# Patient Record
Sex: FEMALE | Race: WHITE | NOT HISPANIC OR LATINO | Employment: OTHER | URBAN - METROPOLITAN AREA
[De-identification: names, ages, dates, MRNs, and addresses within clinical notes are randomized per-mention and may not be internally consistent; named-entity substitution may affect disease eponyms.]

---

## 2017-03-23 ENCOUNTER — APPOINTMENT (OUTPATIENT)
Dept: GENERAL RADIOLOGY | Facility: HOSPITAL | Age: 82
End: 2017-03-23

## 2017-03-23 ENCOUNTER — HOSPITAL ENCOUNTER (EMERGENCY)
Facility: HOSPITAL | Age: 82
Discharge: HOME | End: 2017-03-23
Attending: EMERGENCY MEDICINE | Admitting: EMERGENCY MEDICINE

## 2017-03-23 ENCOUNTER — APPOINTMENT (OUTPATIENT)
Dept: CT IMAGING | Facility: HOSPITAL | Age: 82
End: 2017-03-23

## 2017-03-23 VITALS
SYSTOLIC BLOOD PRESSURE: 144 MMHG | OXYGEN SATURATION: 93 % | DIASTOLIC BLOOD PRESSURE: 75 MMHG | BODY MASS INDEX: 21.6 KG/M2 | RESPIRATION RATE: 14 BRPM | WEIGHT: 110 LBS | TEMPERATURE: 98.2 F | HEART RATE: 77 BPM | HEIGHT: 60 IN

## 2017-03-23 DIAGNOSIS — R29.6 MULTIPLE FALLS: ICD-10-CM

## 2017-03-23 DIAGNOSIS — I62.01 ACUTE ON CHRONIC INTRACRANIAL SUBDURAL HEMATOMA (HCC): Primary | ICD-10-CM

## 2017-03-23 DIAGNOSIS — I62.03 ACUTE ON CHRONIC INTRACRANIAL SUBDURAL HEMATOMA (HCC): Primary | ICD-10-CM

## 2017-03-23 DIAGNOSIS — S01.81XA FACIAL LACERATION, INITIAL ENCOUNTER: ICD-10-CM

## 2017-03-23 DIAGNOSIS — I10 UNCONTROLLED HYPERTENSION: ICD-10-CM

## 2017-03-23 DIAGNOSIS — T07.XXXA MULTIPLE ABRASIONS: ICD-10-CM

## 2017-03-23 LAB
ANION GAP SERPL CALCULATED.3IONS-SCNC: 5 MMOL/L (ref 3–11)
BASOPHILS # BLD AUTO: 0.02 10*3/MM3 (ref 0–0.2)
BASOPHILS NFR BLD AUTO: 0.3 % (ref 0–1)
BUN BLD-MCNC: 26 MG/DL (ref 9–23)
BUN/CREAT SERPL: 16.3 (ref 7–25)
CALCIUM SPEC-SCNC: 9.9 MG/DL (ref 8.7–10.4)
CHLORIDE SERPL-SCNC: 108 MMOL/L (ref 99–109)
CO2 SERPL-SCNC: 25 MMOL/L (ref 20–31)
CREAT BLD-MCNC: 1.6 MG/DL (ref 0.6–1.3)
DEPRECATED RDW RBC AUTO: 48.6 FL (ref 37–54)
EOSINOPHIL # BLD AUTO: 0.12 10*3/MM3 (ref 0.1–0.3)
EOSINOPHIL NFR BLD AUTO: 1.8 % (ref 0–3)
ERYTHROCYTE [DISTWIDTH] IN BLOOD BY AUTOMATED COUNT: 13.2 % (ref 11.3–14.5)
GFR SERPL CREATININE-BSD FRML MDRD: 31 ML/MIN/1.73
GLUCOSE BLD-MCNC: 96 MG/DL (ref 70–100)
HCT VFR BLD AUTO: 34.6 % (ref 34.5–44)
HGB BLD-MCNC: 11.6 G/DL (ref 11.5–15.5)
HOLD SPECIMEN: NORMAL
HOLD SPECIMEN: NORMAL
IMM GRANULOCYTES # BLD: 0 10*3/MM3 (ref 0–0.03)
IMM GRANULOCYTES NFR BLD: 0 % (ref 0–0.6)
LYMPHOCYTES # BLD AUTO: 1.26 10*3/MM3 (ref 0.6–4.8)
LYMPHOCYTES NFR BLD AUTO: 19.1 % (ref 24–44)
MCH RBC QN AUTO: 33.9 PG (ref 27–31)
MCHC RBC AUTO-ENTMCNC: 33.5 G/DL (ref 32–36)
MCV RBC AUTO: 101.2 FL (ref 80–99)
MONOCYTES # BLD AUTO: 0.83 10*3/MM3 (ref 0–1)
MONOCYTES NFR BLD AUTO: 12.6 % (ref 0–12)
NEUTROPHILS # BLD AUTO: 4.38 10*3/MM3 (ref 1.5–8.3)
NEUTROPHILS NFR BLD AUTO: 66.2 % (ref 41–71)
PLATELET # BLD AUTO: 313 10*3/MM3 (ref 150–450)
PMV BLD AUTO: 9.7 FL (ref 6–12)
POTASSIUM BLD-SCNC: 3.9 MMOL/L (ref 3.5–5.5)
RBC # BLD AUTO: 3.42 10*6/MM3 (ref 3.89–5.14)
SODIUM BLD-SCNC: 138 MMOL/L (ref 132–146)
TROPONIN I SERPL-MCNC: 0 NG/ML (ref 0–0.07)
WBC NRBC COR # BLD: 6.61 10*3/MM3 (ref 3.5–10.8)
WHOLE BLOOD HOLD SPECIMEN: NORMAL
WHOLE BLOOD HOLD SPECIMEN: NORMAL

## 2017-03-23 PROCEDURE — 25010000002 TDAP 5-2.5-18.5 LF-MCG/0.5 SUSPENSION: Performed by: EMERGENCY MEDICINE

## 2017-03-23 PROCEDURE — 85025 COMPLETE CBC W/AUTO DIFF WBC: CPT | Performed by: EMERGENCY MEDICINE

## 2017-03-23 PROCEDURE — 90471 IMMUNIZATION ADMIN: CPT | Performed by: EMERGENCY MEDICINE

## 2017-03-23 PROCEDURE — 70450 CT HEAD/BRAIN W/O DYE: CPT

## 2017-03-23 PROCEDURE — 72125 CT NECK SPINE W/O DYE: CPT

## 2017-03-23 PROCEDURE — 80048 BASIC METABOLIC PNL TOTAL CA: CPT | Performed by: EMERGENCY MEDICINE

## 2017-03-23 PROCEDURE — 93005 ELECTROCARDIOGRAM TRACING: CPT

## 2017-03-23 PROCEDURE — 99285 EMERGENCY DEPT VISIT HI MDM: CPT

## 2017-03-23 PROCEDURE — 73030 X-RAY EXAM OF SHOULDER: CPT

## 2017-03-23 PROCEDURE — 36415 COLL VENOUS BLD VENIPUNCTURE: CPT

## 2017-03-23 PROCEDURE — 96376 TX/PRO/DX INJ SAME DRUG ADON: CPT

## 2017-03-23 PROCEDURE — 90715 TDAP VACCINE 7 YRS/> IM: CPT | Performed by: EMERGENCY MEDICINE

## 2017-03-23 PROCEDURE — 73130 X-RAY EXAM OF HAND: CPT

## 2017-03-23 PROCEDURE — 96374 THER/PROPH/DIAG INJ IV PUSH: CPT

## 2017-03-23 PROCEDURE — 84484 ASSAY OF TROPONIN QUANT: CPT

## 2017-03-23 RX ORDER — BACITRACIN, NEOMYCIN, POLYMYXIN B 400; 3.5; 5 [USP'U]/G; MG/G; [USP'U]/G
1 OINTMENT TOPICAL ONCE
Status: DISCONTINUED | OUTPATIENT
Start: 2017-03-23 | End: 2017-03-23

## 2017-03-23 RX ORDER — LABETALOL HYDROCHLORIDE 5 MG/ML
20 INJECTION, SOLUTION INTRAVENOUS
Status: DISCONTINUED | OUTPATIENT
Start: 2017-03-23 | End: 2017-03-23 | Stop reason: HOSPADM

## 2017-03-23 RX ORDER — AMITRIPTYLINE HYDROCHLORIDE 25 MG/1
25 TABLET, FILM COATED ORAL NIGHTLY
COMMUNITY

## 2017-03-23 RX ORDER — LABETALOL HYDROCHLORIDE 5 MG/ML
20 INJECTION, SOLUTION INTRAVENOUS ONCE
Status: COMPLETED | OUTPATIENT
Start: 2017-03-23 | End: 2017-03-23

## 2017-03-23 RX ORDER — ACETAMINOPHEN 500 MG
1000 TABLET ORAL DAILY
COMMUNITY

## 2017-03-23 RX ORDER — METOPROLOL SUCCINATE 50 MG/1
50 TABLET, EXTENDED RELEASE ORAL DAILY
COMMUNITY

## 2017-03-23 RX ORDER — SODIUM CHLORIDE 0.9 % (FLUSH) 0.9 %
10 SYRINGE (ML) INJECTION AS NEEDED
Status: DISCONTINUED | OUTPATIENT
Start: 2017-03-23 | End: 2017-03-23 | Stop reason: HOSPADM

## 2017-03-23 RX ORDER — BACITRACIN, NEOMYCIN, POLYMYXIN B 400; 3.5; 5 [USP'U]/G; MG/G; [USP'U]/G
1 OINTMENT TOPICAL ONCE
Status: COMPLETED | OUTPATIENT
Start: 2017-03-23 | End: 2017-03-23

## 2017-03-23 RX ADMIN — LABETALOL HYDROCHLORIDE 20 MG: 5 INJECTION, SOLUTION INTRAVENOUS at 13:10

## 2017-03-23 RX ADMIN — TETANUS TOXOID, REDUCED DIPHTHERIA TOXOID AND ACELLULAR PERTUSSIS VACCINE, ADSORBED 0.5 ML: 5; 2.5; 8; 8; 2.5 SUSPENSION INTRAMUSCULAR at 13:09

## 2017-03-23 RX ADMIN — LABETALOL HYDROCHLORIDE 20 MG: 5 INJECTION, SOLUTION INTRAVENOUS at 14:08

## 2017-03-23 RX ADMIN — LABETALOL HYDROCHLORIDE 20 MG: 5 INJECTION, SOLUTION INTRAVENOUS at 14:34

## 2017-03-23 RX ADMIN — BACITRACIN, NEOMYCIN, POLYMYXIN B 1 APPLICATION: 400; 3.5; 5 OINTMENT TOPICAL at 14:36

## 2017-03-23 NOTE — DISCHARGE INSTRUCTIONS
Follow up with ELOY Sultana, or another primary care provider for further care and evaluation of your elevated blood pressure. Use a walker when you are up and walking. Follow up with Dr. Jhony Corley next week.    Return to the Emergency Department for new or worsening concerns.

## 2017-03-23 NOTE — PROGRESS NOTES
Continued Stay Note  [facility]     Patient Name: Adele Ramsey  MRN: [id number]  Today's Date: 3/23/2017    Admit Date: 3/23/2017          Discharge Plan       03/23/17 1516    Case Management/Social Work Plan    Plan discharge planning    Patient/Family In Agreement With Plan unable to assess    Additional Comments JORDANA was contacted to speak to pt. and her daughter, Jerry Guillermo, regarding care services in the home.  JORDANA provided pt. and daughter with a list of sitter service providers in [place] as well as home health agencies.  SW explained the difference in the services and provided support.  Pt. and daughter appeared more interested in home health services at this time.  Pt. confirmed that she lives in her home alone in [place] and has assistive devices available to her, however her daughter stated pt. refuses to use them.  JORDANA encouraged pt. to use her cane/walker when ambulating.  JORDANA was requested by pt./daughter to call pt.'s PCP (Erika Driscoll PA-C at [facility], [phone]) to inquire about order for home health services and to schedule an appointment if needed.  JORDANA spoke with Zahraa at [facility] and it was confirmed that pt. required a face-to-face in order to receive a home health order.  A follow-up appointment was scheduled for Monday, 3/27/17 at 1:30pm.  JORDANA called pt's daughter to inform her of the scheduled appointment.  Daughter was unsure if pt. would follow-through with home health services.  JORDANA strongly encouraged daughter to assist in getting pt. to follow-up with her PCP to further addres home health needs.  Daughter verbalized understanding and reported no further needs.               Discharge Codes     None            NORA Cross

## 2017-03-23 NOTE — ED PROVIDER NOTES
Subjective   HPI Comments: Adele Ramsey is a 85 y.o.female who presents to the ED c/o fall, which occurred just PTA. Pt reports she was walking on an unsteady sidewalk when she somehow fell onto her right side and hit her head in the street. She denies any LOC associated with the fall and she does not take blood thinners. Upon examination in the ED, pt reports pain to the right side of her head, right wrist and shoulders with some mild blurred vision in her right eye. Her right shoulder pain is greater than her left shoulder. Pt has a c-collar in place and she denies any neck pain, CP or SOA. Her neighbor reports she has been increasingly week the past several weeks and notes the pt's  passed away last week.      Hx of cataracts with corrective surgery.    Pt sees a Nurse Practitioner in [place] for her primary care.    Patient is a 85 y.o. female presenting with fall.   History provided by:  Patient and friend  Fall   Mechanism of injury: fall    Injury location:  Head/neck and shoulder/arm  Head/neck injury location:  Head  Shoulder/arm injury location:  R shoulder and R wrist  Incident location:  Street  Arrived directly from scene: yes    Fall:     Fall occurred:  Walking    Impact surface:  [place]    Point of impact:  Head  Protective equipment: none    Tetanus status:  Out of date  Prior to arrival data:     Patient ambulatory at scene: no      Blood loss:  Minimal    Responsiveness at scene:  Alert    Loss of consciousness: no    Current pain details:     Pain Severity:  Moderate    Pain timing:  Constant  Associated symptoms: headaches    Associated symptoms: no abdominal pain, no back pain, no blindness, no chest pain, no difficulty breathing, no loss of consciousness and no neck pain    Headaches:     Severity:  Moderate    Onset quality:  Sudden    Timing:  Constant    Chronicity:  New  Risk factors: no anticoagulation therapy        Review of Systems   Eyes: Negative for blindness.   Respiratory:  Negative for shortness of breath.    Cardiovascular: Negative for chest pain.   Gastrointestinal: Negative for abdominal pain.   Musculoskeletal: Negative for back pain and neck pain.   Neurological: Positive for headaches. Negative for loss of consciousness.   All other systems reviewed and are negative.      Past Medical History:   Diagnosis Date   • Arthritis    • GERD (gastroesophageal reflux disease)    • Hypertension        No Known Allergies    History reviewed. No pertinent surgical history.    History reviewed. No pertinent family history.    Social History     Social History   • Marital status:      Spouse name: N/A   • Number of children: N/A   • Years of education: N/A     Social History Main Topics   • Smoking status: Heavy Tobacco Smoker     Packs/day: 1.00     Years: 60.00     Types: Cigarettes   • Smokeless tobacco: None   • Alcohol use 4.2 oz/week     7 Glasses of wine per week   • Drug use: No   • Sexual activity: No     Other Topics Concern   • None     Social History Narrative    Recent death of spouse w/in last week         Objective   Physical Exam   Constitutional: She is oriented to person, place, and time. She appears well-developed and well-nourished.   HENT:   Head: Normocephalic.   Right Ear: External ear normal.   Left Ear: External ear normal.   Nose: Nose normal.   Mouth/Throat: Oropharynx is clear and moist.   1 cm shallow laceration to lateral aspect of right eyebrow. Bruising of right eyelid. Airway is patent and pharynx is benign.   Eyes: Conjunctivae are normal.   Anterior chamber andd posterior chambers of right eye are clear.   Neck: Neck supple.   Cardiovascular: Normal rate, regular rhythm and normal heart sounds.  Exam reveals no gallop and no friction rub.    No murmur heard.  Pulmonary/Chest: Effort normal and breath sounds normal. No respiratory distress. She has no wheezes. She has no rales. She exhibits no tenderness.   Abdominal: Soft. There is no tenderness.    Musculoskeletal: Normal range of motion. She exhibits tenderness (Pain in right trapezius.).   No spinal tenderness. Pelvis is stable.   Neurological: She is alert and oriented to person, place, and time.   Skin: Skin is warm and dry.   Minor abrasion of right shoulder. Abrasions of right little finger and across knuckles of her right hand except her thumb. Abrasion of right knee.   Psychiatric: She has a normal mood and affect. Her behavior is normal. Judgment and thought content normal.   Nursing note and vitals reviewed.      Laceration Repair  Date/Time: 3/23/2017 12:08 PM  Performed by: RODGER HEATH  Authorized by: RODGER HEATH   Consent: Verbal consent obtained.  Consent given by: patient  Patient identity confirmed: verbally with patient  Body area: head/neck  Location details: right eyebrow  Laceration length: 1.5 cm  Foreign bodies: no foreign bodies  Sedation:  Patient sedated: no    Irrigation solution: tap water  Amount of cleaning: standard  Skin closure: glue  Approximation: close  Approximation difficulty: simple  Patient tolerance: Patient tolerated the procedure well with no immediate complications               ED Course  ED Course   Comment By Time   I discussed the pt's case with Dr. Corley, neurologist, who indicates the pt should be able to be followed on an out patient basis.  PINA Murray Jose Francisco 03/23 1340   Pt has persistently high blood pressure here in the ED. We will treat her with labetalol.  PINA Murray Jose Francisco 03/23 1345   Main concern is her safety.  She has been reluctant to use a walker despite numerous falls but has one.  Our  has interviewed her and suggested home health help.  I have recommended physical therapy as well as use of her walker. Rodger Heath MD 03/23 7744   She tells me that her BP is normally well controlled.  In fact, it was recently too low and her metoprolol dose was halved. Rodger Heath MD 03/23 3178         Recent  Results (from the past 24 hour(s))   Light Blue Top    Collection Time: 03/23/17 11:53 AM   Result Value Ref Range    Extra Tube hold for add-on    Green Top (Gel)    Collection Time: 03/23/17 11:53 AM   Result Value Ref Range    Extra Tube Hold for add-ons.    Lavender Top    Collection Time: 03/23/17 11:53 AM   Result Value Ref Range    Extra Tube hold for add-on    Gold Top - SST    Collection Time: 03/23/17 11:53 AM   Result Value Ref Range    Extra Tube Hold for add-ons.    Basic Metabolic Panel    Collection Time: 03/23/17 11:53 AM   Result Value Ref Range    Glucose 96 70 - 100 mg/dL    BUN 26 (H) 9 - 23 mg/dL    Creatinine 1.60 (H) 0.60 - 1.30 mg/dL    Sodium 138 132 - 146 mmol/L    Potassium 3.9 3.5 - 5.5 mmol/L    Chloride 108 99 - 109 mmol/L    CO2 25.0 20.0 - 31.0 mmol/L    Calcium 9.9 8.7 - 10.4 mg/dL    eGFR Non African Amer 31 (L) >60 mL/min/1.73    BUN/Creatinine Ratio 16.3 7.0 - 25.0    Anion Gap 5.0 3.0 - 11.0 mmol/L   CBC Auto Differential    Collection Time: 03/23/17 11:53 AM   Result Value Ref Range    WBC 6.61 3.50 - 10.80 10*3/mm3    RBC 3.42 (L) 3.89 - 5.14 10*6/mm3    Hemoglobin 11.6 11.5 - 15.5 g/dL    Hematocrit 34.6 34.5 - 44.0 %    .2 (H) 80.0 - 99.0 fL    MCH 33.9 (H) 27.0 - 31.0 pg    MCHC 33.5 32.0 - 36.0 g/dL    RDW 13.2 11.3 - 14.5 %    RDW-SD 48.6 37.0 - 54.0 fl    MPV 9.7 6.0 - 12.0 fL    Platelets 313 150 - 450 10*3/mm3    Neutrophil % 66.2 41.0 - 71.0 %    Lymphocyte % 19.1 (L) 24.0 - 44.0 %    Monocyte % 12.6 (H) 0.0 - 12.0 %    Eosinophil % 1.8 0.0 - 3.0 %    Basophil % 0.3 0.0 - 1.0 %    Immature Grans % 0.0 0.0 - 0.6 %    Neutrophils, Absolute 4.38 1.50 - 8.30 10*3/mm3    Lymphocytes, Absolute 1.26 0.60 - 4.80 10*3/mm3    Monocytes, Absolute 0.83 0.00 - 1.00 10*3/mm3    Eosinophils, Absolute 0.12 0.10 - 0.30 10*3/mm3    Basophils, Absolute 0.02 0.00 - 0.20 10*3/mm3    Immature Grans, Absolute 0.00 0.00 - 0.03 10*3/mm3   POC Troponin, Rapid    Collection Time: 03/23/17  11:54 AM   Result Value Ref Range    Troponin I 0.00 0.00 - 0.07 ng/mL     Note: In addition to lab results from this visit, the labs listed above may include labs taken at another facility or during a different encounter within the last 24 hours. Please correlate lab times with ED admission and discharge times for further clarification of the services performed during this visit.    XR Shoulder 2+ View Right   Final Result   Osteoarthritis. There are no acute findings.       D:  03/23/2017   E:  03/23/2017       This report was finalized on 3/23/2017 3:56 PM by Dr. Reed Solano MD.          XR Hand 3+ View Right   Final Result   Advanced diffuse osteoarthritis without acute finding.       D:  03/23/2017   E:  03/23/2017       This report was finalized on 3/23/2017 3:56 PM by Dr. Reed Solano MD.          CT Head Without Contrast   Final Result   Small left frontal subdural hematoma at least a component   which is acute.         D:  03/23/2017   E:  03/23/2017           This report was finalized on 3/23/2017 3:55 PM by Dr. Reed Solano MD.          CT Cervical Spine Without Contrast   Final Result   Osteoarthritic changes with no acute findings.       D:  03/23/2017   E:  03/23/2017           This report was finalized on 3/23/2017 3:54 PM by Dr. Reed Solano MD.            Vitals:    03/23/17 1445 03/23/17 1450 03/23/17 1455 03/23/17 1456   BP: 163/63  144/75    Patient Position:       Pulse: 76 76  77   Resp:       Temp:       SpO2: 98%   93%   Weight:       Height:         Medications   Tdap (BOOSTRIX) injection 0.5 mL (0.5 mL Intramuscular Given 3/23/17 1309)   labetalol (NORMODYNE,TRANDATE) injection 20 mg (20 mg Intravenous Given 3/23/17 1310)   neomycin-bacitracin-polymyxin (NEOSPORIN) ointment 1 application (1 application Topical Given 3/23/17 1436)     ECG/EMG Results (last 24 hours)     Procedure Component Value Units Date/Time    ECG 12 Lead [[id number]] Collected:  03/23/17 1137     Updated:  03/23/17 1202     Narrative:       Test Reason : fall, htn  Blood Pressure : **/** mmHG  Vent. Rate : 084 BPM     Atrial Rate : 084 BPM     P-R Int : 184 ms          QRS Dur : 084 ms      QT Int : 386 ms       P-R-T Axes : 077 -40 027 degrees     QTc Int : 456 ms    Normal sinus rhythm  Possible Left atrial enlargement  Left axis deviation  RSR' or QR pattern in V1 suggests right ventricular conduction delay  Left ventricular hypertrophy  Cannot rule out Septal infarct , age undetermined  Abnormal ECG  No previous ECGs available  Confirmed by MISHA HEATH MD ([id number]) on 3/23/2017 12:02:38 PM    Referred By:  EDMD           Confirmed By:MISHA HEATH MD                    [facility]    Final diagnoses:   Acute on chronic intracranial subdural hematoma   Facial laceration, initial encounter   Multiple abrasions   Multiple falls   Uncontrolled hypertension       Documentation assistance provided by cris Felton.  Information recorded by the scribe was done at my direction and has been verified and validated by me.     Jeremiah Felton  03/23/17 1213       Jeremiah Felton  03/23/17 1422       Jeremiah Felton  03/23/17 1434       Misha Heath MD  03/23/17 2591